# Patient Record
Sex: FEMALE | Race: WHITE | NOT HISPANIC OR LATINO | URBAN - METROPOLITAN AREA
[De-identification: names, ages, dates, MRNs, and addresses within clinical notes are randomized per-mention and may not be internally consistent; named-entity substitution may affect disease eponyms.]

---

## 2022-07-29 ENCOUNTER — OFFICE VISIT (OUTPATIENT)
Dept: URBAN - METROPOLITAN AREA CLINIC 22 | Facility: CLINIC | Age: 47
End: 2022-07-29
Payer: COMMERCIAL

## 2022-07-29 DIAGNOSIS — H52.4 PRESBYOPIA: ICD-10-CM

## 2022-07-29 DIAGNOSIS — H16.223 KERATOCONJUNCT SICCA, NOT SPECIFIED AS SJOGREN'S, BILATERAL: Primary | ICD-10-CM

## 2022-07-29 PROCEDURE — 99204 OFFICE O/P NEW MOD 45 MIN: CPT | Performed by: STUDENT IN AN ORGANIZED HEALTH CARE EDUCATION/TRAINING PROGRAM

## 2022-07-29 RX ORDER — CYCLOSPORINE 0.5 MG/ML
0.05 % EMULSION OPHTHALMIC
Qty: 180 | Refills: 3 | Status: ACTIVE
Start: 2022-07-29

## 2022-07-29 RX ORDER — HYPROMELLOSE 0 G/G
0.3 % GEL OPHTHALMIC
Refills: 0 | Status: ACTIVE
Start: 2022-07-29

## 2022-07-29 ASSESSMENT — VISUAL ACUITY
OD: 20/20
OS: 20/30

## 2022-07-29 ASSESSMENT — INTRAOCULAR PRESSURE
OD: 15
OS: 12

## 2022-07-29 NOTE — IMPRESSION/PLAN
Impression: Keratoconjunct sicca, not specified as Sjogren's, bilateral: W96.741. Plan: Discussed findings and educated patient on condition. Inadequate relief with OTC artificial tears. Previously on Minus Mage, had side effects. Rx Restasis BID OU. Discussed potential side effects and that onset of full efficacy of medication may take 3-6 months. Rx preservative free artificial tears QID OU, warm compresses BID OU, lid scrubs BID OU in conjunction with medication. D/c Tobradex.

## 2022-07-29 NOTE — IMPRESSION/PLAN
Impression: Presbyopia: H52.4. Plan: Refractive error also accounts for patient's complaints. Recommend new glasses Rx; RTC for refraction after treating #1 for 1 month.

## 2025-04-04 ENCOUNTER — OFFICE VISIT (OUTPATIENT)
Dept: URBAN - METROPOLITAN AREA CLINIC 40 | Facility: CLINIC | Age: 50
End: 2025-04-04
Payer: COMMERCIAL

## 2025-04-04 DIAGNOSIS — H43.392 OTHER VITREOUS OPACITIES, LEFT EYE: ICD-10-CM

## 2025-04-04 DIAGNOSIS — H02.431 PARALYTIC PTOSIS OF RIGHT EYELID: ICD-10-CM

## 2025-04-04 DIAGNOSIS — H04.123 DRY EYE SYNDROME OF BILATERAL LACRIMAL GLANDS: Primary | ICD-10-CM

## 2025-04-04 PROCEDURE — 99214 OFFICE O/P EST MOD 30 MIN: CPT | Performed by: OPTOMETRIST

## 2025-04-04 ASSESSMENT — KERATOMETRY
OS: 41.88
OD: 41.75

## 2025-04-04 ASSESSMENT — INTRAOCULAR PRESSURE
OD: 14
OS: 14